# Patient Record
Sex: FEMALE | Race: WHITE | NOT HISPANIC OR LATINO | Employment: STUDENT | URBAN - METROPOLITAN AREA
[De-identification: names, ages, dates, MRNs, and addresses within clinical notes are randomized per-mention and may not be internally consistent; named-entity substitution may affect disease eponyms.]

---
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
## 2020-01-09 ENCOUNTER — TELEPHONE (OUTPATIENT)
Dept: FAMILY MEDICINE CLINIC | Facility: CLINIC | Age: 20
End: 2020-01-09

## 2020-01-09 NOTE — TELEPHONE ENCOUNTER
Patients mom Jeny called wanting to get her daughter Porsha a referral to see a dermatologist. She has real bad swollen eye lids.    Patient's mom would like a call back regarding this referral [phone]      Please advise

## 2020-01-09 NOTE — TELEPHONE ENCOUNTER
Called and spoke to patients mother and informed the referral was placed on 12/23/19 she was transferred to  to  See why no one had called to set up the appt

## 2020-02-25 ENCOUNTER — OFFICE VISIT (OUTPATIENT)
Dept: FAMILY MEDICINE CLINIC | Facility: CLINIC | Age: 20
End: 2020-02-25

## 2020-02-25 VITALS
RESPIRATION RATE: 14 BRPM | OXYGEN SATURATION: 98 % | SYSTOLIC BLOOD PRESSURE: 102 MMHG | HEIGHT: 69 IN | WEIGHT: 181 LBS | HEART RATE: 113 BPM | BODY MASS INDEX: 26.81 KG/M2 | TEMPERATURE: 99.4 F | DIASTOLIC BLOOD PRESSURE: 68 MMHG

## 2020-02-25 DIAGNOSIS — R68.89 FLU-LIKE SYMPTOMS: Primary | ICD-10-CM

## 2020-02-25 DIAGNOSIS — J11.1 FLU: ICD-10-CM

## 2020-02-25 LAB
EXPIRATION DATE: ABNORMAL
FLUAV AG NPH QL: NEGATIVE
FLUBV AG NPH QL: POSITIVE
INTERNAL CONTROL: ABNORMAL
Lab: ABNORMAL

## 2020-02-25 PROCEDURE — 87804 INFLUENZA ASSAY W/OPTIC: CPT | Performed by: NURSE PRACTITIONER

## 2020-02-25 PROCEDURE — 99213 OFFICE O/P EST LOW 20 MIN: CPT | Performed by: NURSE PRACTITIONER

## 2020-02-25 RX ORDER — OSELTAMIVIR PHOSPHATE 75 MG/1
75 CAPSULE ORAL 2 TIMES DAILY
Qty: 10 CAPSULE | Refills: 0 | Status: SHIPPED | OUTPATIENT
Start: 2020-02-25 | End: 2020-03-01

## 2020-02-25 NOTE — PATIENT INSTRUCTIONS
"Return if symptoms worsen or fail to improve.      Influenza, Adult  Influenza, more commonly known as \"the flu,\" is a viral infection that mainly affects the respiratory tract. The respiratory tract includes organs that help you breathe, such as the lungs, nose, and throat. The flu causes many symptoms similar to the common cold along with high fever and body aches.  The flu spreads easily from person to person (is contagious). Getting a flu shot (influenza vaccination) every year is the best way to prevent the flu.  What are the causes?  This condition is caused by the influenza virus. You can get the virus by:  · Breathing in droplets that are in the air from an infected person's cough or sneeze.  · Touching something that has been exposed to the virus (has been contaminated) and then touching your mouth, nose, or eyes.  What increases the risk?  The following factors may make you more likely to get the flu:  · Not washing or sanitizing your hands often.  · Having close contact with many people during cold and flu season.  · Touching your mouth, eyes, or nose without first washing or sanitizing your hands.  · Not getting a yearly (annual) flu shot.  You may have a higher risk for the flu, including serious problems such as a lung infection (pneumonia), if you:  · Are older than 65.  · Are pregnant.  · Have a weakened disease-fighting system (immune system). You may have a weakened immune system if you:  ? Have HIV or AIDS.  ? Are undergoing chemotherapy.  ? Are taking medicines that reduce (suppress) the activity of your immune system.  · Have a long-term (chronic) illness, such as heart disease, kidney disease, diabetes, or lung disease.  · Have a liver disorder.  · Are severely overweight (morbidly obese).  · Have anemia. This is a condition that affects your red blood cells.  · Have asthma.  What are the signs or symptoms?  Symptoms of this condition usually begin suddenly and last 4-14 days. They may " include:  · Fever and chills.  · Headaches, body aches, or muscle aches.  · Sore throat.  · Cough.  · Runny or stuffy (congested) nose.  · Chest discomfort.  · Poor appetite.  · Weakness or fatigue.  · Dizziness.  · Nausea or vomiting.  How is this diagnosed?  This condition may be diagnosed based on:  · Your symptoms and medical history.  · A physical exam.  · Swabbing your nose or throat and testing the fluid for the influenza virus.  How is this treated?  If the flu is diagnosed early, you can be treated with medicine that can help reduce how severe the illness is and how long it lasts (antiviral medicine). This may be given by mouth (orally) or through an IV.  Taking care of yourself at home can help relieve symptoms. Your health care provider may recommend:  · Taking over-the-counter medicines.  · Drinking plenty of fluids.  In many cases, the flu goes away on its own. If you have severe symptoms or complications, you may be treated in a hospital.  Follow these instructions at home:  Activity  · Rest as needed and get plenty of sleep.  · Stay home from work or school as told by your health care provider. Unless you are visiting your health care provider, avoid leaving home until your fever has been gone for 24 hours without taking medicine.  Eating and drinking  · Take an oral rehydration solution (ORS). This is a drink that is sold at pharmacies and retail stores.  · Drink enough fluid to keep your urine pale yellow.  · Drink clear fluids in small amounts as you are able. Clear fluids include water, ice chips, diluted fruit juice, and low-calorie sports drinks.  · Eat bland, easy-to-digest foods in small amounts as you are able. These foods include bananas, applesauce, rice, lean meats, toast, and crackers.  · Avoid drinking fluids that contain a lot of sugar or caffeine, such as energy drinks, regular sports drinks, and soda.  · Avoid alcohol.  · Avoid spicy or fatty foods.  General instructions      "    · Take over-the-counter and prescription medicines only as told by your health care provider.  · Use a cool mist humidifier to add humidity to the air in your home. This can make it easier to breathe.  · Cover your mouth and nose when you cough or sneeze.  · Wash your hands with soap and water often, especially after you cough or sneeze. If soap and water are not available, use alcohol-based hand .  · Keep all follow-up visits as told by your health care provider. This is important.  How is this prevented?    · Get an annual flu shot. You may get the flu shot in late summer, fall, or winter. Ask your health care provider when you should get your flu shot.  · Avoid contact with people who are sick during cold and flu season. This is generally fall and winter.  Contact a health care provider if:  · You develop new symptoms.  · You have:  ? Chest pain.  ? Diarrhea.  ? A fever.  · Your cough gets worse.  · You produce more mucus.  · You feel nauseous or you vomit.  Get help right away if:  · You develop shortness of breath or difficulty breathing.  · Your skin or nails turn a bluish color.  · You have severe pain or stiffness in your neck.  · You develop a sudden headache or sudden pain in your face or ear.  · You cannot eat or drink without vomiting.  Summary  · Influenza, more commonly known as \"the flu,\" is a viral infection that primarily affects your respiratory tract.  · Symptoms of the flu usually begin suddenly and last 4-14 days.  · Getting an annual flu shot is the best way to prevent getting the flu.  · Stay home from work or school as told by your health care provider. Unless you are visiting your health care provider, avoid leaving home until your fever has been gone for 24 hours without taking medicine.  · Keep all follow-up visits as told by your health care provider. This is important.  This information is not intended to replace advice given to you by your health care provider. Make sure " you discuss any questions you have with your health care provider.  Document Released: 12/15/2001 Document Revised: 06/05/2019 Document Reviewed: 06/05/2019  Elsevier Interactive Patient Education © 2020 [employer]

## 2020-02-25 NOTE — PROGRESS NOTES
Subjective   Porsha Galan is a 20 y.o. female.     History of Present Illness   Patient here for cough,  congestion and fever. She states that the  highest her fever was last night and was 102. She has been taking acetaminophen for her fever.     The following portions of the patient's history were reviewed and updated as appropriate: allergies, current medications, past family history, past medical history, past social history, past surgical history and problem list.    Review of Systems   Constitutional: Positive for chills, fatigue and fever. Negative for appetite change.   HENT: Positive for congestion, postnasal drip and voice change. Negative for ear pain, rhinorrhea, sinus pressure, sneezing and sore throat.    Eyes: Negative for redness and itching.   Respiratory: Positive for cough and shortness of breath. Negative for chest tightness.    Cardiovascular: Negative for chest pain, palpitations and leg swelling.   Musculoskeletal: Positive for myalgias.   Allergic/Immunologic: Negative.    Neurological: Positive for headache. Negative for dizziness.       Objective   Physical Exam   Constitutional: She is oriented to person, place, and time. She appears well-developed and well-nourished.   HENT:   Head: Normocephalic and atraumatic.   Right Ear: External ear and ear canal normal. A middle ear effusion is present.   Left Ear: External ear and ear canal normal. A middle ear effusion is present.   Nose: Rhinorrhea and congestion present.   Mouth/Throat: Uvula is midline and mucous membranes are normal. Oropharyngeal exudate, posterior oropharyngeal edema and posterior oropharyngeal erythema present. Tonsils are 2+ on the right. Tonsils are 2+ on the left. No tonsillar exudate.   Eyes: Pupils are equal, round, and reactive to light. Conjunctivae and EOM are normal. Right eye exhibits no discharge. Left eye exhibits no discharge.   Neck: Normal range of motion. Neck supple. No thyromegaly present.   Cardiovascular:  "Normal rate, regular rhythm, normal heart sounds and intact distal pulses.   No murmur heard.  Pulmonary/Chest: Effort normal and breath sounds normal. No tachypnea. No respiratory distress. She has no decreased breath sounds. She has no wheezes. She has no rales.   Lymphadenopathy:     She has no cervical adenopathy.   Neurological: She is alert and oriented to person, place, and time.   Skin: Skin is warm and dry.   Psychiatric: She has a normal mood and affect. Her behavior is normal. Judgment and thought content normal.   Nursing note and vitals reviewed.      Vitals:    02/25/20 0929   BP: 102/68   Pulse: 113   Resp: 14   Temp: 99.4 °F (37.4 °C)   SpO2: 98%     Body mass index is 26.73 kg/m².    Procedures    Assessment/Plan   Problems Addressed this Visit     None      Visit Diagnoses     Flu-like symptoms    -  Primary    Relevant Orders    POCT Influenza A/B        Positive for flu B  Return if symptoms worsen or fail to improve.          Patient Instructions   Return if symptoms worsen or fail to improve.      Influenza, Adult  Influenza, more commonly known as \"the flu,\" is a viral infection that mainly affects the respiratory tract. The respiratory tract includes organs that help you breathe, such as the lungs, nose, and throat. The flu causes many symptoms similar to the common cold along with high fever and body aches.  The flu spreads easily from person to person (is contagious). Getting a flu shot (influenza vaccination) every year is the best way to prevent the flu.  What are the causes?  This condition is caused by the influenza virus. You can get the virus by:  · Breathing in droplets that are in the air from an infected person's cough or sneeze.  · Touching something that has been exposed to the virus (has been contaminated) and then touching your mouth, nose, or eyes.  What increases the risk?  The following factors may make you more likely to get the flu:  · Not washing or sanitizing your hands " often.  · Having close contact with many people during cold and flu season.  · Touching your mouth, eyes, or nose without first washing or sanitizing your hands.  · Not getting a yearly (annual) flu shot.  You may have a higher risk for the flu, including serious problems such as a lung infection (pneumonia), if you:  · Are older than 65.  · Are pregnant.  · Have a weakened disease-fighting system (immune system). You may have a weakened immune system if you:  ? Have HIV or AIDS.  ? Are undergoing chemotherapy.  ? Are taking medicines that reduce (suppress) the activity of your immune system.  · Have a long-term (chronic) illness, such as heart disease, kidney disease, diabetes, or lung disease.  · Have a liver disorder.  · Are severely overweight (morbidly obese).  · Have anemia. This is a condition that affects your red blood cells.  · Have asthma.  What are the signs or symptoms?  Symptoms of this condition usually begin suddenly and last 4-14 days. They may include:  · Fever and chills.  · Headaches, body aches, or muscle aches.  · Sore throat.  · Cough.  · Runny or stuffy (congested) nose.  · Chest discomfort.  · Poor appetite.  · Weakness or fatigue.  · Dizziness.  · Nausea or vomiting.  How is this diagnosed?  This condition may be diagnosed based on:  · Your symptoms and medical history.  · A physical exam.  · Swabbing your nose or throat and testing the fluid for the influenza virus.  How is this treated?  If the flu is diagnosed early, you can be treated with medicine that can help reduce how severe the illness is and how long it lasts (antiviral medicine). This may be given by mouth (orally) or through an IV.  Taking care of yourself at home can help relieve symptoms. Your health care provider may recommend:  · Taking over-the-counter medicines.  · Drinking plenty of fluids.  In many cases, the flu goes away on its own. If you have severe symptoms or complications, you may be treated in a  hospital.  Follow these instructions at home:  Activity  · Rest as needed and get plenty of sleep.  · Stay home from work or school as told by your health care provider. Unless you are visiting your health care provider, avoid leaving home until your fever has been gone for 24 hours without taking medicine.  Eating and drinking  · Take an oral rehydration solution (ORS). This is a drink that is sold at pharmacies and retail stores.  · Drink enough fluid to keep your urine pale yellow.  · Drink clear fluids in small amounts as you are able. Clear fluids include water, ice chips, diluted fruit juice, and low-calorie sports drinks.  · Eat bland, easy-to-digest foods in small amounts as you are able. These foods include bananas, applesauce, rice, lean meats, toast, and crackers.  · Avoid drinking fluids that contain a lot of sugar or caffeine, such as energy drinks, regular sports drinks, and soda.  · Avoid alcohol.  · Avoid spicy or fatty foods.  General instructions         · Take over-the-counter and prescription medicines only as told by your health care provider.  · Use a cool mist humidifier to add humidity to the air in your home. This can make it easier to breathe.  · Cover your mouth and nose when you cough or sneeze.  · Wash your hands with soap and water often, especially after you cough or sneeze. If soap and water are not available, use alcohol-based hand .  · Keep all follow-up visits as told by your health care provider. This is important.  How is this prevented?    · Get an annual flu shot. You may get the flu shot in late summer, fall, or winter. Ask your health care provider when you should get your flu shot.  · Avoid contact with people who are sick during cold and flu season. This is generally fall and winter.  Contact a health care provider if:  · You develop new symptoms.  · You have:  ? Chest pain.  ? Diarrhea.  ? A fever.  · Your cough gets worse.  · You produce more mucus.  · You feel  "nauseous or you vomit.  Get help right away if:  · You develop shortness of breath or difficulty breathing.  · Your skin or nails turn a bluish color.  · You have severe pain or stiffness in your neck.  · You develop a sudden headache or sudden pain in your face or ear.  · You cannot eat or drink without vomiting.  Summary  · Influenza, more commonly known as \"the flu,\" is a viral infection that primarily affects your respiratory tract.  · Symptoms of the flu usually begin suddenly and last 4-14 days.  · Getting an annual flu shot is the best way to prevent getting the flu.  · Stay home from work or school as told by your health care provider. Unless you are visiting your health care provider, avoid leaving home until your fever has been gone for 24 hours without taking medicine.  · Keep all follow-up visits as told by your health care provider. This is important.  This information is not intended to replace advice given to you by your health care provider. Make sure you discuss any questions you have with your health care provider.  Document Released: 12/15/2001 Document Revised: 06/05/2019 Document Reviewed: 06/05/2019  [employer] Interactive Patient Education © 2020 [employer]        "

## 2020-03-30 ENCOUNTER — TELEPHONE (OUTPATIENT)
Dept: FAMILY MEDICINE CLINIC | Facility: CLINIC | Age: 20
End: 2020-03-30

## 2020-03-30 NOTE — TELEPHONE ENCOUNTER
PT CALLED STATING HAS BEEN WITH RUNNY NOSE AND EAR ACHE FOR A FEW DAYS. BELIEVES IS DUE TO ALLERGIES.    CAN PRESCRIBE A MEDICINE TO HELP?    CB#: [phone]

## 2020-03-30 NOTE — TELEPHONE ENCOUNTER
Patient informed to take Zyrtec and flonase if no better by Thursday call back patient stated understanding

## 2020-07-27 ENCOUNTER — TELEPHONE (OUTPATIENT)
Dept: FAMILY MEDICINE CLINIC | Facility: CLINIC | Age: 20
End: 2020-07-27

## 2020-07-27 NOTE — TELEPHONE ENCOUNTER
PATIENT CALLED TO CHECK WITH [facility] IF THE REFERRAL FOR OB/GYN COULD BE DONE SOONER, SINCE SHE IS LOOKING TO GET INTO BIRTH CONTROL ASAP. IF NOT WOULD LIKE AN APPT WITH [facility] AS SOON AS THIS WEEK.  PLEASE ADVISE

## 2020-09-30 ENCOUNTER — OFFICE VISIT (OUTPATIENT)
Dept: OBSTETRICS AND GYNECOLOGY | Age: 20
End: 2020-09-30

## 2020-09-30 VITALS
WEIGHT: 173.4 LBS | SYSTOLIC BLOOD PRESSURE: 104 MMHG | HEIGHT: 69 IN | BODY MASS INDEX: 25.68 KG/M2 | DIASTOLIC BLOOD PRESSURE: 58 MMHG

## 2020-09-30 DIAGNOSIS — Z30.09 ENCOUNTER FOR COUNSELING REGARDING CONTRACEPTION: ICD-10-CM

## 2020-09-30 DIAGNOSIS — N94.6 DYSMENORRHEA: ICD-10-CM

## 2020-09-30 DIAGNOSIS — Z01.419 WELL WOMAN EXAM WITH ROUTINE GYNECOLOGICAL EXAM: Primary | ICD-10-CM

## 2020-09-30 DIAGNOSIS — Z11.3 SCREEN FOR STD (SEXUALLY TRANSMITTED DISEASE): ICD-10-CM

## 2020-09-30 DIAGNOSIS — N92.0 MENORRHAGIA WITH REGULAR CYCLE: ICD-10-CM

## 2020-09-30 PROCEDURE — 99385 PREV VISIT NEW AGE 18-39: CPT | Performed by: OBSTETRICS & GYNECOLOGY

## 2020-09-30 NOTE — PROGRESS NOTES
"Chief complaint: annual/ New gyn    Subjective   History of Present Illness    Porsha Galan is a 20 y.o.  who presents for annual exam/ New gyn. Sent from primary care for contraception counseling. She has been sexually active in the past but not currently. She does desire contraception. She used ocps in the past but dc'd due to moodiness. She does have some acne. She c/o menorrhagia and dysmenorrhea.  Her menses are regular every 28-30 days, lasting 4 days, +menorrhagia and dysmenorrhea (lasts 3 days)  Soc hx- graduated from [employer]  in [place]. 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         Current contraception: abstinence  History of abnormal Pap smear: no  Received Gardasil immunization: yes -    Perform regular self breast exam: yes -    Family history of uterine or ovarian cancer: no  Family History of colon cancer: no  Family history of breast cancer: yes - paternal aunt    Mammogram: not indicated.  Colonoscopy: not indicated.  DEXA: not indicated.    Exercise: moderately active  Calcium/Vitamin D: adequate intake    The following portions of the patient's history were reviewed and updated as appropriate: allergies, current medications, past family history, past medical history, past social history, past surgical history and problem list.    Review of Systems    Pertinent items are noted in HPI.     Objective   Physical Exam    /58   Ht 175.3 cm (69\")   Wt 78.7 kg (173 lb 6.4 oz)   LMP 2020 (Approximate)   Breastfeeding No   BMI 25.61 kg/m²     General:   alert, appears stated age and cooperative   Neck: no asymmetry, masses, or scars "   Heart: regular rate and rhythm, S1, S2 normal, no murmur, click, rub or gallop   Lungs: clear to auscultation bilaterally   Abdomen: soft, non-tender, without masses or organomegaly   Breast: inspection negative, no nipple discharge or bleeding, no masses or nodularity palpable   Vulva: normal, Bartholin's, Urethra, [place]'s normal   Vagina: normal mucosa   Cervix: no cervical motion tenderness, no lesions and nulliparous appearance   Uterus: normal size, anteverted, mobile, non-tender, normal shape and consistency   Adnexa: normal adnexa and no mass, fullness, tenderness   Rectal: not indicated     Assessment/Plan   Porsha was seen today for gynecologic exam and establish care.    Diagnoses and all orders for this visit:    Well woman exam with routine gynecological exam    Screen for STD (sexually transmitted disease)  -     Chlamydia trachomatis, Neisseria gonorrhoeae, Trichomonas vaginalis, PCR - Swab, Vagina    Encounter for counseling regarding contraception    Menorrhagia with regular cycle  -     Norethin-Eth Estrad-Fe Biphas 1 MG-10 MCG / 10 MCG tablet; Take 1 tablet by mouth Daily.    Dysmenorrhea  -     Norethin-Eth Estrad-Fe Biphas 1 MG-10 MCG / 10 MCG tablet; Take 1 tablet by mouth Daily.    discussed options for contraception including a low dose ocp, nexplanon or IUD (progesterone) risks/benefits of different options reviewed  She would like to try a low dose/ weight neutral pill. This should improve her menorrhagia and dysmenorrhea and may help with acn  Risks of ocps- DVT,PE, stroke    If she dislikes this pill, she will consider Nexplanon or Kyleena    Breast self exam technique reviewed and patient encouraged to perform self-exam monthly.  Discussed healthy lifestyle modifications.  Pap smear not needed

## 2020-10-02 LAB
C TRACH RRNA SPEC QL NAA+PROBE: NEGATIVE
N GONORRHOEA RRNA SPEC QL NAA+PROBE: NEGATIVE
T VAGINALIS DNA SPEC QL NAA+PROBE: NEGATIVE

## 2020-10-08 ENCOUNTER — TELEPHONE (OUTPATIENT)
Dept: OBSTETRICS AND GYNECOLOGY | Age: 20
End: 2020-10-08

## 2020-11-03 ENCOUNTER — OFFICE VISIT (OUTPATIENT)
Dept: FAMILY MEDICINE CLINIC | Facility: CLINIC | Age: 20
End: 2020-11-03

## 2020-11-03 VITALS
RESPIRATION RATE: 14 BRPM | WEIGHT: 180 LBS | OXYGEN SATURATION: 98 % | DIASTOLIC BLOOD PRESSURE: 72 MMHG | HEIGHT: 69 IN | SYSTOLIC BLOOD PRESSURE: 118 MMHG | BODY MASS INDEX: 26.66 KG/M2

## 2020-11-03 DIAGNOSIS — Z23 NEED FOR IMMUNIZATION AGAINST INFLUENZA: ICD-10-CM

## 2020-11-03 DIAGNOSIS — R41.840 POOR CONCENTRATION: Primary | ICD-10-CM

## 2020-11-03 PROCEDURE — 99213 OFFICE O/P EST LOW 20 MIN: CPT | Performed by: NURSE PRACTITIONER

## 2020-11-03 PROCEDURE — 90471 IMMUNIZATION ADMIN: CPT | Performed by: NURSE PRACTITIONER

## 2020-11-03 PROCEDURE — 90686 IIV4 VACC NO PRSV 0.5 ML IM: CPT | Performed by: NURSE PRACTITIONER

## 2020-11-03 RX ORDER — NORETHINDRONE AND ETHINYL ESTRADIOL 0.8-25(24)
1 KIT ORAL DAILY
COMMUNITY
Start: 2020-10-20 | End: 2021-07-27 | Stop reason: SDUPTHER

## 2020-11-03 NOTE — PROGRESS NOTES
Subjective   Porsha Galan is a 20 y.o. female.     History of Present Illness   Patient presents with c/o of trouble focusing, ongoing. She reports that she's always had an issue with focusing, but since she's had to go to online classes it's become worse. She has never been treated or evaluated for ADD or ADHD.    The following portions of the patient's history were reviewed and updated as appropriate: allergies, current medications, past family history, past medical history, past social history, past surgical history and problem list.    Review of Systems   Constitutional: Negative for chills, fatigue and fever.   Respiratory: Negative for cough, chest tightness, shortness of breath and wheezing.    Cardiovascular: Negative for chest pain, palpitations and leg swelling.   Neurological: Negative for dizziness and headache.   Hematological: Negative.    Psychiatric/Behavioral: Positive for decreased concentration. Negative for sleep disturbance, depressed mood and stress. The patient is not nervous/anxious.        Objective   Physical Exam  Vitals signs and nursing note reviewed.   Constitutional:       Appearance: She is well-developed.   HENT:      Head: Normocephalic and atraumatic.   Eyes:      Conjunctiva/sclera: Conjunctivae normal.      Pupils: Pupils are equal, round, and reactive to light.   Cardiovascular:      Rate and Rhythm: Normal rate and regular rhythm.      Heart sounds: Normal heart sounds. No murmur.   Pulmonary:      Effort: Pulmonary effort is normal.      Breath sounds: Normal breath sounds.   Neurological:      Mental Status: She is alert and oriented to person, place, and time.   Psychiatric:         Behavior: Behavior normal.         Thought Content: Thought content normal.         Judgment: Judgment normal.         Vitals:    11/03/20 1017   BP: 118/72   Resp: 14   SpO2: 98%     Body mass index is 26.58 kg/m².    Procedures    Assessment/Plan   Problems Addressed this Visit     None       Visit Diagnoses     Poor concentration    -  Primary    Relevant Orders    Ambulatory Referral to Neuropsychology    Need for immunization against influenza        Relevant Orders    FluLaval Quad >6 Months ([id number])      Diagnoses       Codes Comments    Poor concentration    -  Primary ICD-10-CM: R41.840  ICD-9-CM: 799.51     Need for immunization against influenza     ICD-10-CM: Z23  ICD-9-CM: V04.81         Follow-up as needed.         Patient Instructions   Follow-up as needed.  Call with any questions or concerns.

## 2021-07-27 ENCOUNTER — TELEPHONE (OUTPATIENT)
Dept: OBSTETRICS AND GYNECOLOGY | Age: 21
End: 2021-07-27

## 2021-07-27 RX ORDER — NORETHINDRONE AND ETHINYL ESTRADIOL 0.8-25(24)
1 KIT ORAL DAILY
Qty: 28 TABLET | Refills: 3 | Status: SHIPPED | OUTPATIENT
Start: 2021-07-27 | End: 2021-10-06

## 2021-07-27 NOTE — TELEPHONE ENCOUNTER
Pt is requesting refills till her appt in October.    kaitlib fe 0.8-0.025mg chew    To [facility] [place]

## 2021-10-06 ENCOUNTER — OFFICE VISIT (OUTPATIENT)
Dept: OBSTETRICS AND GYNECOLOGY | Age: 21
End: 2021-10-06

## 2021-10-06 VITALS
HEIGHT: 69 IN | SYSTOLIC BLOOD PRESSURE: 112 MMHG | WEIGHT: 180.4 LBS | BODY MASS INDEX: 26.72 KG/M2 | DIASTOLIC BLOOD PRESSURE: 62 MMHG

## 2021-10-06 DIAGNOSIS — Z12.4 SCREENING FOR CERVICAL CANCER: ICD-10-CM

## 2021-10-06 DIAGNOSIS — Z01.419 WELL WOMAN EXAM WITH ROUTINE GYNECOLOGICAL EXAM: Primary | ICD-10-CM

## 2021-10-06 DIAGNOSIS — Z11.3 SCREEN FOR STD (SEXUALLY TRANSMITTED DISEASE): ICD-10-CM

## 2021-10-06 DIAGNOSIS — Z30.09 ENCOUNTER FOR OTHER GENERAL COUNSELING OR ADVICE ON CONTRACEPTION: ICD-10-CM

## 2021-10-06 PROCEDURE — 99395 PREV VISIT EST AGE 18-39: CPT | Performed by: STUDENT IN AN ORGANIZED HEALTH CARE EDUCATION/TRAINING PROGRAM

## 2021-10-06 RX ORDER — PROGESTERONE 200 MG/1
200 CAPSULE ORAL DAILY
Qty: 2 CAPSULE | Refills: 0 | Status: SHIPPED | OUTPATIENT
Start: 2021-10-06 | End: 2021-12-28

## 2021-10-06 NOTE — PATIENT INSTRUCTIONS
Wait 1 week after starting new contraception before relying on it for birth control.  Take progesterone the night before IUD insertion.  Take ibuprofen 800 mg 2 to 4 hours before IUD insertion.

## 2021-10-06 NOTE — PROGRESS NOTES
[facility]   Obstetrics and Gynecology   Routine Annual Visit    10/6/2021    Patient: Porsha Galan          MR#:[id number]    History of Present Illness    Chief Complaint   Patient presents with   • Gynecologic Exam     AE today, Last AE 2020, No pap yet, No problems today       21 y.o. female  who presents for annual exam.  She has been on OCPs for a long time but stopped them in July.  She feels like she is emotionally disregulated on OCPs.  She is interested in discussing other contraceptive options.  She is sexually active with her boyfriend only.    Status post Covid vaccine.  Has not yet gotten flu shot.    Obstetric History:  OB History        0    Para   0    Term   0       0    AB   0    Living   0       SAB   0    TAB   0    Ectopic   0    Molar   0    Multiple   0    Live Births   0               Menstrual History:     Patient's last menstrual period was 2021 (approximate).       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, also has 2 internships and plays on a league volleyball team  ________________________________________  Patient Active Problem List   Diagnosis   • Sports physical     Past Medical History:   Diagnosis Date   • Allergic rhinitis      Past Surgical History:   Procedure Laterality Date   • TYMPANOSTOMY TUBE PLACEMENT     • WISDOM TOOTH EXTRACTION Bilateral      Social History     Tobacco Use   Smoking Status Never Smoker   Smokeless Tobacco Never Used     Family History   Problem Relation Age of Onset   • No Known Problems Mother    • No Known Problems Father    • No Known Problems Brother    • Breast cancer Paternal Aunt    • Hypertension Maternal Grandmother    • Diabetes Maternal Grandfather    • Hypertension Maternal Grandfather    • Ovarian cancer Neg Hx    • Uterine cancer Neg Hx    • Colon cancer Neg Hx    • Deep vein thrombosis Neg  "Hx    • Pulmonary embolism Neg Hx      Prior to Admission medications    Medication Sig Start Date End Date Taking? Authorizing Provider   Oswald Fe 0.8-25 MG-MCG chewable tablet Chew 1 tablet Daily. 7/27/21   Christina John PA     ________________________________________    Current contraception: none  History of abnormal Pap smear: no  Family history of uterine or ovarian cancer: no  Family History of colon cancer/colon polyps: no  History of abnormal mammogram: no  History of abnormal lipids: no    The following portions of the patient's history were reviewed and updated as appropriate: allergies, current medications, past family history, past medical history, past social history, past surgical history and problem list.    Review of Systems   All other systems reviewed and are negative.           Objective     /62   Ht 175.3 cm (69\")   Wt 81.8 kg (180 lb 6.4 oz)   LMP 09/17/2021 (Approximate)   Breastfeeding No   BMI 26.64 kg/m²    BP Readings from Last 3 Encounters:   10/06/21 112/62   11/03/20 118/72   09/30/20 104/58      Wt Readings from Last 3 Encounters:   10/06/21 81.8 kg (180 lb 6.4 oz)   11/03/20 81.6 kg (180 lb)   09/30/20 78.7 kg (173 lb 6.4 oz)        BMI: Estimated body mass index is 26.64 kg/m² as calculated from the following:    Height as of this encounter: 175.3 cm (69\").    Weight as of this encounter: 81.8 kg (180 lb 6.4 oz).    Physical Exam  Vitals and nursing note reviewed. Exam conducted with a chaperone present.   Constitutional:       General: She is not in acute distress.     Appearance: Normal appearance.   HENT:      Head: Normocephalic and atraumatic.   Eyes:      Extraocular Movements: Extraocular movements intact.   Cardiovascular:      Rate and Rhythm: Normal rate and regular rhythm.      Pulses: Normal pulses.      Heart sounds: No murmur heard.     Pulmonary:      Effort: Pulmonary effort is normal. No respiratory distress.      Breath sounds: Normal breath " sounds.   Chest:      Breasts:         Right: Normal. No mass, nipple discharge, skin change or tenderness.         Left: Normal. No mass, nipple discharge, skin change or tenderness.   Abdominal:      General: There is no distension.      Palpations: Abdomen is soft. There is no mass.      Tenderness: There is no abdominal tenderness.   Genitourinary:     General: Normal vulva.      Labia:         Right: No rash or lesion.         Left: No rash or lesion.       Urethra: No prolapse, urethral swelling or urethral lesion.      Vagina: Normal.      Cervix: Normal.      Uterus: Normal.       Adnexa: Right adnexa normal and left adnexa normal.      Comments: Bladder: no masses or tenderness  Perineum/Anus: no masses, lesions, or skin changes  Musculoskeletal:         General: No swelling. Normal range of motion.      Cervical back: Normal range of motion.   Lymphadenopathy:      Upper Body:      Right upper body: No axillary adenopathy.      Left upper body: No axillary adenopathy.   Skin:     General: Skin is warm and dry.   Neurological:      General: No focal deficit present.      Mental Status: She is alert and oriented to person, place, and time.   Psychiatric:         Mood and Affect: Mood normal.         Behavior: Behavior normal.         As part of wellness and prevention, the following topics were discussed with the patient:  Encouraged self breast exam  Physical activity and regular exercised encouraged.   Injury prevention discussed.  Healthy weight discussed.  Nutrition discussed.  Substance abuse/misuse discussed.  Sexual behavior/safe practices discussed.   Sexual transmitted disease prevention   Contraception discussed.   Mental health discussed.   Vaccinations/immunizations addressed.             Assessment:  Diagnoses and all orders for this visit:    1. Well woman exam with routine gynecological exam (Primary)  -     IGP,CtNgTv,rfx Apt HPV All  - HIV  - RPR  -Breast and pelvic exam normal  -STD screen  today  -Pap smear today  -Status post Covid vaccine, encouraged flu shot when it is available to her    2. Screening for cervical cancer  -     IGP,CtNgTv,rfx Apt HPV All    3. Encounter for other general counseling or advice on contraception  -We discussed all contraceptive options  -Patient would like Mirena IUD.  She would like Ortho Evra in the meantime while we are checking benefits.  We discussed waiting 1 week before relying on this for birth control.  -Check IUD benefits today and schedule for insertion  -Progesterone sent to pharmacy for night before insertion.  Instructed patient to take ibuprofen 800 mg 2 to 4 hours before insertion.    4. Screen for STD (sexually transmitted disease)  -     IGP,CtNgTv,rfx Apt HPV All    Other orders  -     norelgestromin-ethinyl estradiol (ORTHO EVRA) 150-35 MCG/24HR; Place 1 patch on the skin as directed by provider 1 (One) Time Per Week.  Dispense: 3 patch; Refill: 12  -     Progesterone (Prometrium) 200 MG capsule; Take 1 capsule by mouth Daily.  Dispense: 2 capsule; Refill: 0          Plan:  RTC for IUD insertion      Miley Jimenes MD  10/6/2021 14:39 EDT

## 2021-10-08 LAB
C TRACH RRNA CVX QL NAA+PROBE: NEGATIVE
CONV .: NORMAL
CYTOLOGIST CVX/VAG CYTO: NORMAL
CYTOLOGY CVX/VAG DOC CYTO: NORMAL
CYTOLOGY CVX/VAG DOC THIN PREP: NORMAL
DX ICD CODE: NORMAL
HIV 1 & 2 AB SER-IMP: NORMAL
N GONORRHOEA RRNA CVX QL NAA+PROBE: NEGATIVE
OTHER STN SPEC: NORMAL
STAT OF ADQ CVX/VAG CYTO-IMP: NORMAL
T VAGINALIS RRNA SPEC QL NAA+PROBE: NEGATIVE

## 2021-10-21 ENCOUNTER — TELEPHONE (OUTPATIENT)
Dept: OBSTETRICS AND GYNECOLOGY | Age: 21
End: 2021-10-21

## 2021-10-21 NOTE — TELEPHONE ENCOUNTER
LMTC regarding lab orders.  Specimens were not obtained when she was  in the office.  Does she still plan on doing these tests?

## 2021-12-16 ENCOUNTER — TELEPHONE (OUTPATIENT)
Dept: OBSTETRICS AND GYNECOLOGY | Age: 21
End: 2021-12-16

## 2021-12-16 NOTE — TELEPHONE ENCOUNTER
Patient would like a new rx for ocp's.She stated the birth control patch is not working out for her. Her lmp was 12/15/21.She uses the [facility] on file.

## 2021-12-22 NOTE — TELEPHONE ENCOUNTER
Spoke w/pt.  She has decided against the IUD for now.  She would like to talk to you about different BCP options.  She said she did not really like the two that she has been on in the past.

## 2021-12-23 NOTE — TELEPHONE ENCOUNTER
Could you please call and schedule a gyn follow up appt w/Dr. Jimenes for this pt to discuss birth control options?

## 2021-12-28 ENCOUNTER — OFFICE VISIT (OUTPATIENT)
Dept: OBSTETRICS AND GYNECOLOGY | Age: 21
End: 2021-12-28

## 2021-12-28 VITALS
SYSTOLIC BLOOD PRESSURE: 114 MMHG | BODY MASS INDEX: 27.4 KG/M2 | HEIGHT: 69 IN | WEIGHT: 185 LBS | DIASTOLIC BLOOD PRESSURE: 74 MMHG

## 2021-12-28 DIAGNOSIS — Z30.09 ENCOUNTER FOR OTHER GENERAL COUNSELING OR ADVICE ON CONTRACEPTION: Primary | ICD-10-CM

## 2021-12-28 PROCEDURE — 99213 OFFICE O/P EST LOW 20 MIN: CPT | Performed by: STUDENT IN AN ORGANIZED HEALTH CARE EDUCATION/TRAINING PROGRAM

## 2021-12-28 NOTE — PROGRESS NOTES
[facility]   Obstetrics and Gynecology     2021      Patient:  Porsha Galan   MR#:[id number]    Office note    Chief Complaint   Patient presents with   • Follow-up     Discuss birth control options       Subjective     History of Present Illness  21 y.o. female  presents for f/u of contraception counseling.  Patient had previously opted for IUD but then got nervous and canceled appointment.  She tried the patch but could not keep it on her left shoulder for more than a day or 2.  She now wants to discuss contraceptive options again.      Patient Active Problem List   Diagnosis   • Sports physical       Past Medical History:   Diagnosis Date   • Allergic rhinitis      Past Surgical History:   Procedure Laterality Date   • TYMPANOSTOMY TUBE PLACEMENT     • WISDOM TOOTH EXTRACTION Bilateral      Obstetric History:  OB History        0    Para   0    Term   0       0    AB   0    Living   0       SAB   0    IAB   0    Ectopic   0    Molar   0    Multiple   0    Live Births   0               Menstrual History:     Patient's last menstrual period was 2021 (approximate).       The patient has never been pregnant.  Family History   Problem Relation Age of Onset   • No Known Problems Mother    • No Known Problems Father    • No Known Problems Brother    • Breast cancer Paternal Aunt    • Hypertension Maternal Grandmother    • Diabetes Maternal Grandfather    • Hypertension Maternal Grandfather    • Ovarian cancer Neg Hx    • Uterine cancer Neg Hx    • Colon cancer Neg Hx    • Deep vein thrombosis Neg Hx    • Pulmonary embolism Neg Hx      Social History     Tobacco Use   • Smoking status: Never Smoker   • Smokeless tobacco: Never Used   Vaping Use   • Vaping Use: Never used   Substance Use Topics   • Alcohol use: Yes     Comment: OCCASIONAL   • Drug use: No     Patient has no known allergies.    Current Outpatient Medications:   •  norelgestromin-ethinyl estradiol (ORTHO EVRA)  "150-35 MCG/24HR, Place 1 patch on the skin as directed by provider 1 (One) Time Per Week., Disp: 3 patch, Rfl: 12    The following portions of the patient's history were reviewed and updated as appropriate: allergies, current medications, past family history, past medical history, past social history, past surgical history and problem list.    Review of Systems   All other systems reviewed and are negative.      BP Readings from Last 3 Encounters:   12/28/21 114/74   10/06/21 112/62   11/03/20 118/72      Wt Readings from Last 3 Encounters:   12/28/21 83.9 kg (185 lb)   10/06/21 81.8 kg (180 lb 6.4 oz)   11/03/20 81.6 kg (180 lb)      BMI: Estimated body mass index is 27.32 kg/m² as calculated from the following:    Height as of this encounter: 175.3 cm (69\").    Weight as of this encounter: 83.9 kg (185 lb). BSA: Estimated body surface area is 2 meters squared as calculated from the following:    Height as of this encounter: 175.3 cm (69\").    Weight as of this encounter: 83.9 kg (185 lb).    Objective   Physical Exam  Vitals and nursing note reviewed.   Constitutional:       General: She is not in acute distress.     Appearance: Normal appearance.   Pulmonary:      Effort: Pulmonary effort is normal.   Neurological:      Mental Status: She is alert.         Assessment/Plan     Diagnoses and all orders for this visit:    1. Encounter for other general counseling or advice on contraception (Primary)    -Discussed all contraceptive options, patient opted for IUD  -Plan to check benefits for Mirena IUD and schedule insertion before patient goes back to school on the 18th  -Discussed taking Prometrium and ibuprofen prior to appointment    Return for schedule IUD insertion ASAP (goes back to school 1/17).    Miley Jimenes MD   12/28/2021 11:54 EST  "

## 2021-12-28 NOTE — PATIENT INSTRUCTIONS
Morning of IUD insertion, take prometrium.  30min before appointment, take ibuprofen 800mg.   Pt mother notified and she states she no longer needs it.

## 2021-12-30 ENCOUNTER — OFFICE VISIT (OUTPATIENT)
Dept: OBSTETRICS AND GYNECOLOGY | Age: 21
End: 2021-12-30

## 2021-12-30 VITALS
SYSTOLIC BLOOD PRESSURE: 118 MMHG | BODY MASS INDEX: 27.28 KG/M2 | HEIGHT: 69 IN | DIASTOLIC BLOOD PRESSURE: 70 MMHG | WEIGHT: 184.2 LBS

## 2021-12-30 DIAGNOSIS — Z01.812 PRE-PROCEDURE LAB EXAM: ICD-10-CM

## 2021-12-30 DIAGNOSIS — Z30.430 ENCOUNTER FOR IUD INSERTION: Primary | ICD-10-CM

## 2021-12-30 LAB
B-HCG UR QL: NEGATIVE
EXPIRATION DATE: NORMAL
INTERNAL NEGATIVE CONTROL: NEGATIVE
INTERNAL POSITIVE CONTROL: POSITIVE
Lab: NORMAL

## 2021-12-30 PROCEDURE — 81025 URINE PREGNANCY TEST: CPT | Performed by: STUDENT IN AN ORGANIZED HEALTH CARE EDUCATION/TRAINING PROGRAM

## 2021-12-30 PROCEDURE — 58300 INSERT INTRAUTERINE DEVICE: CPT | Performed by: STUDENT IN AN ORGANIZED HEALTH CARE EDUCATION/TRAINING PROGRAM

## 2021-12-30 RX ORDER — IBUPROFEN 800 MG/1
800 TABLET ORAL EVERY 6 HOURS PRN
COMMUNITY

## 2021-12-30 NOTE — PROGRESS NOTES
IUD Insertion Procedure Note    Indication: Desires long acting reversible contraception     Procedure Details   Urine pregnancy test confirmed negative.  The risks (including infection, bleeding, pain, and uterine perforation) and benefits of the procedure were explained to the patient and verbal informed consent was obtained.      Cervix was cleansed with Betadine. Allis clamp applied to anterior cervix.  Uterus sounded to 8 cm. IUD inserted without difficulty. Strings trimmed to 2-3 cm.    IUD Information:  Mirena, Lot # [id number], Expiration date 3/2024.    Condition:  Stable    Complications:  None, patient tolerated the procedure well    Plan:  The patient was advised to call for fever, prolonged or severe pain, or persistent bleeding. She was advised to use NSAIDs as needed for mild to moderate pain.  Follow up PRN.    Miley Jimenes MD  12/30/21  16:13 EST

## 2022-07-25 ENCOUNTER — TRANSCRIBE ORDERS (OUTPATIENT)
Dept: LAB | Facility: HOSPITAL | Age: 22
End: 2022-07-25

## 2022-07-25 ENCOUNTER — LAB (OUTPATIENT)
Dept: LAB | Facility: HOSPITAL | Age: 22
End: 2022-07-25

## 2022-07-25 DIAGNOSIS — L57.8 NODULAR ELASTOSIS WITH CYSTS AND COMEDONES OF FAVRE AND RACOUCHOT: ICD-10-CM

## 2022-07-25 DIAGNOSIS — L70.0 NODULAR ELASTOSIS WITH CYSTS AND COMEDONES OF FAVRE AND RACOUCHOT: Primary | ICD-10-CM

## 2022-07-25 DIAGNOSIS — L57.8 NODULAR ELASTOSIS WITH CYSTS AND COMEDONES OF FAVRE AND RACOUCHOT: Primary | ICD-10-CM

## 2022-07-25 DIAGNOSIS — L70.0 NODULAR ELASTOSIS WITH CYSTS AND COMEDONES OF FAVRE AND RACOUCHOT: ICD-10-CM

## 2022-07-25 LAB
CHOLEST SERPL-MCNC: 139 MG/DL (ref 0–200)
HCG SERPL QL: NEGATIVE
HDLC SERPL-MCNC: 49 MG/DL (ref 40–60)
LDLC SERPL CALC-MCNC: 76 MG/DL (ref 0–100)
LDLC/HDLC SERPL: 1.56 {RATIO}
TRIGL SERPL-MCNC: 69 MG/DL (ref 0–150)
VLDLC SERPL-MCNC: 14 MG/DL (ref 5–40)

## 2022-07-25 PROCEDURE — 80061 LIPID PANEL: CPT

## 2022-07-25 PROCEDURE — 36415 COLL VENOUS BLD VENIPUNCTURE: CPT

## 2022-07-25 PROCEDURE — 84703 CHORIONIC GONADOTROPIN ASSAY: CPT

## 2022-08-23 ENCOUNTER — LAB (OUTPATIENT)
Dept: LAB | Facility: HOSPITAL | Age: 22
End: 2022-08-23

## 2022-08-23 ENCOUNTER — TRANSCRIBE ORDERS (OUTPATIENT)
Dept: ADMINISTRATIVE | Facility: HOSPITAL | Age: 22
End: 2022-08-23

## 2022-08-23 DIAGNOSIS — L70.0 NODULAR ELASTOSIS WITH CYSTS AND COMEDONES OF FAVRE AND RACOUCHOT: Primary | ICD-10-CM

## 2022-08-23 DIAGNOSIS — L57.8 NODULAR ELASTOSIS WITH CYSTS AND COMEDONES OF FAVRE AND RACOUCHOT: Primary | ICD-10-CM

## 2022-08-23 LAB — HCG INTACT+B SERPL-ACNC: <1 MIU/ML

## 2022-08-23 PROCEDURE — 36415 COLL VENOUS BLD VENIPUNCTURE: CPT | Performed by: DERMATOLOGY

## 2022-08-23 PROCEDURE — 84702 CHORIONIC GONADOTROPIN TEST: CPT | Performed by: DERMATOLOGY

## 2022-09-23 ENCOUNTER — LAB (OUTPATIENT)
Dept: LAB | Facility: HOSPITAL | Age: 22
End: 2022-09-23

## 2022-09-23 ENCOUNTER — TRANSCRIBE ORDERS (OUTPATIENT)
Dept: ADMINISTRATIVE | Facility: HOSPITAL | Age: 22
End: 2022-09-23

## 2022-09-23 DIAGNOSIS — L70.0 ACNE VULGARIS: ICD-10-CM

## 2022-09-23 DIAGNOSIS — L70.0 ACNE VULGARIS: Primary | ICD-10-CM

## 2022-09-23 LAB
ALBUMIN SERPL-MCNC: 4.7 G/DL (ref 3.5–5.2)
ALP SERPL-CCNC: 49 U/L (ref 39–117)
ALT SERPL W P-5'-P-CCNC: 13 U/L (ref 1–33)
AST SERPL-CCNC: 16 U/L (ref 1–32)
BILIRUB CONJ SERPL-MCNC: <0.2 MG/DL (ref 0–0.3)
BILIRUB INDIRECT SERPL-MCNC: NORMAL MG/DL
BILIRUB SERPL-MCNC: 0.5 MG/DL (ref 0–1.2)
CHOLEST SERPL-MCNC: 121 MG/DL (ref 0–200)
HCG SERPL QL: NEGATIVE
HDLC SERPL-MCNC: 53 MG/DL (ref 40–60)
LDLC SERPL CALC-MCNC: 59 MG/DL (ref 0–100)
LDLC/HDLC SERPL: 1.16 {RATIO}
PROT SERPL-MCNC: 6.8 G/DL (ref 6–8.5)
TRIGL SERPL-MCNC: 33 MG/DL (ref 0–150)
VLDLC SERPL-MCNC: 9 MG/DL (ref 5–40)

## 2022-09-23 PROCEDURE — 36415 COLL VENOUS BLD VENIPUNCTURE: CPT

## 2022-09-23 PROCEDURE — 80061 LIPID PANEL: CPT

## 2022-09-23 PROCEDURE — 80076 HEPATIC FUNCTION PANEL: CPT

## 2022-09-23 PROCEDURE — 84703 CHORIONIC GONADOTROPIN ASSAY: CPT

## 2022-10-26 ENCOUNTER — LAB (OUTPATIENT)
Dept: LAB | Facility: HOSPITAL | Age: 22
End: 2022-10-26

## 2022-10-26 ENCOUNTER — TRANSCRIBE ORDERS (OUTPATIENT)
Dept: ADMINISTRATIVE | Facility: HOSPITAL | Age: 22
End: 2022-10-26

## 2022-10-26 DIAGNOSIS — L70.0 ACNE VULGARIS: ICD-10-CM

## 2022-10-26 DIAGNOSIS — L70.0 ACNE VULGARIS: Primary | ICD-10-CM

## 2022-10-26 LAB — HCG SERPL QL: NEGATIVE

## 2022-10-26 PROCEDURE — 84703 CHORIONIC GONADOTROPIN ASSAY: CPT

## 2022-10-26 PROCEDURE — 36415 COLL VENOUS BLD VENIPUNCTURE: CPT

## 2022-10-28 ENCOUNTER — LAB (OUTPATIENT)
Dept: LAB | Facility: HOSPITAL | Age: 22
End: 2022-10-28

## 2022-10-28 ENCOUNTER — TRANSCRIBE ORDERS (OUTPATIENT)
Dept: ADMINISTRATIVE | Facility: HOSPITAL | Age: 22
End: 2022-10-28

## 2022-10-28 DIAGNOSIS — L57.8 NODULAR ELASTOSIS WITH CYSTS AND COMEDONES OF FAVRE AND RACOUCHOT: ICD-10-CM

## 2022-10-28 DIAGNOSIS — L70.0 NODULAR ELASTOSIS WITH CYSTS AND COMEDONES OF FAVRE AND RACOUCHOT: Primary | ICD-10-CM

## 2022-10-28 DIAGNOSIS — L70.0 NODULAR ELASTOSIS WITH CYSTS AND COMEDONES OF FAVRE AND RACOUCHOT: ICD-10-CM

## 2022-10-28 DIAGNOSIS — L57.8 NODULAR ELASTOSIS WITH CYSTS AND COMEDONES OF FAVRE AND RACOUCHOT: Primary | ICD-10-CM

## 2022-10-28 LAB
ALBUMIN SERPL-MCNC: 4.6 G/DL (ref 3.5–5.2)
ALP SERPL-CCNC: 55 U/L (ref 39–117)
ALT SERPL W P-5'-P-CCNC: 14 U/L (ref 1–33)
AST SERPL-CCNC: 21 U/L (ref 1–32)
BILIRUB CONJ SERPL-MCNC: <0.2 MG/DL (ref 0–0.3)
BILIRUB INDIRECT SERPL-MCNC: NORMAL MG/DL
BILIRUB SERPL-MCNC: 0.3 MG/DL (ref 0–1.2)
CHOLEST SERPL-MCNC: 150 MG/DL (ref 0–200)
HDLC SERPL-MCNC: 50 MG/DL (ref 40–60)
LDLC SERPL CALC-MCNC: 84 MG/DL (ref 0–100)
LDLC/HDLC SERPL: 1.67 {RATIO}
PROT SERPL-MCNC: 7 G/DL (ref 6–8.5)
TRIGL SERPL-MCNC: 82 MG/DL (ref 0–150)
VLDLC SERPL-MCNC: 16 MG/DL (ref 5–40)

## 2022-10-28 PROCEDURE — 80076 HEPATIC FUNCTION PANEL: CPT

## 2022-10-28 PROCEDURE — 80061 LIPID PANEL: CPT

## 2022-10-28 PROCEDURE — 36415 COLL VENOUS BLD VENIPUNCTURE: CPT

## 2022-11-25 ENCOUNTER — TRANSCRIBE ORDERS (OUTPATIENT)
Dept: ADMINISTRATIVE | Facility: HOSPITAL | Age: 22
End: 2022-11-25

## 2022-11-25 ENCOUNTER — LAB (OUTPATIENT)
Dept: LAB | Facility: HOSPITAL | Age: 22
End: 2022-11-25

## 2022-11-25 DIAGNOSIS — L70.0 ACNE VULGARIS: ICD-10-CM

## 2022-11-25 DIAGNOSIS — L70.0 ACNE VULGARIS: Primary | ICD-10-CM

## 2022-11-25 LAB
CHOLEST SERPL-MCNC: 139 MG/DL (ref 0–200)
HCG INTACT+B SERPL-ACNC: <1 MIU/ML
HDLC SERPL-MCNC: 52 MG/DL (ref 40–60)
LDLC SERPL CALC-MCNC: 73 MG/DL (ref 0–100)
LDLC/HDLC SERPL: 1.4 {RATIO}
TRIGL SERPL-MCNC: 71 MG/DL (ref 0–150)
VLDLC SERPL-MCNC: 14 MG/DL (ref 5–40)

## 2022-11-25 PROCEDURE — 80061 LIPID PANEL: CPT

## 2022-11-25 PROCEDURE — 84702 CHORIONIC GONADOTROPIN TEST: CPT | Performed by: DERMATOLOGY

## 2022-11-25 PROCEDURE — 36415 COLL VENOUS BLD VENIPUNCTURE: CPT | Performed by: DERMATOLOGY

## 2022-12-28 ENCOUNTER — TRANSCRIBE ORDERS (OUTPATIENT)
Dept: LAB | Facility: HOSPITAL | Age: 22
End: 2022-12-28

## 2022-12-28 ENCOUNTER — LAB (OUTPATIENT)
Dept: LAB | Facility: HOSPITAL | Age: 22
End: 2022-12-28
Payer: COMMERCIAL

## 2022-12-28 DIAGNOSIS — L70.0 NODULAR ELASTOSIS WITH CYSTS AND COMEDONES OF FAVRE AND RACOUCHOT: Primary | ICD-10-CM

## 2022-12-28 DIAGNOSIS — L57.8 NODULAR ELASTOSIS WITH CYSTS AND COMEDONES OF FAVRE AND RACOUCHOT: ICD-10-CM

## 2022-12-28 DIAGNOSIS — L70.0 NODULAR ELASTOSIS WITH CYSTS AND COMEDONES OF FAVRE AND RACOUCHOT: ICD-10-CM

## 2022-12-28 DIAGNOSIS — L57.8 NODULAR ELASTOSIS WITH CYSTS AND COMEDONES OF FAVRE AND RACOUCHOT: Primary | ICD-10-CM

## 2022-12-28 LAB
CHOLEST SERPL-MCNC: 154 MG/DL (ref 0–200)
HDLC SERPL-MCNC: 45 MG/DL (ref 40–60)
LDLC SERPL CALC-MCNC: 96 MG/DL (ref 0–100)
LDLC/HDLC SERPL: 2.14 {RATIO}
TRIGL SERPL-MCNC: 63 MG/DL (ref 0–150)
VLDLC SERPL-MCNC: 13 MG/DL (ref 5–40)

## 2022-12-28 PROCEDURE — 80061 LIPID PANEL: CPT

## 2022-12-28 PROCEDURE — 36415 COLL VENOUS BLD VENIPUNCTURE: CPT

## 2022-12-30 ENCOUNTER — LAB (OUTPATIENT)
Dept: LAB | Facility: HOSPITAL | Age: 22
End: 2022-12-30
Payer: COMMERCIAL

## 2022-12-30 DIAGNOSIS — L57.8 NODULAR ELASTOSIS WITH CYSTS AND COMEDONES OF FAVRE AND RACOUCHOT: ICD-10-CM

## 2022-12-30 DIAGNOSIS — L70.0 NODULAR ELASTOSIS WITH CYSTS AND COMEDONES OF FAVRE AND RACOUCHOT: ICD-10-CM

## 2022-12-30 LAB
ALBUMIN SERPL-MCNC: 4.4 G/DL (ref 3.5–5.2)
ALP SERPL-CCNC: 48 U/L (ref 39–117)
ALT SERPL W P-5'-P-CCNC: 15 U/L (ref 1–33)
AST SERPL-CCNC: 19 U/L (ref 1–32)
BILIRUB CONJ SERPL-MCNC: <0.2 MG/DL (ref 0–0.3)
BILIRUB INDIRECT SERPL-MCNC: NORMAL MG/DL
BILIRUB SERPL-MCNC: 0.2 MG/DL (ref 0–1.2)
HCG SERPL QL: NEGATIVE
PROT SERPL-MCNC: 6.7 G/DL (ref 6–8.5)

## 2022-12-30 PROCEDURE — 84703 CHORIONIC GONADOTROPIN ASSAY: CPT

## 2022-12-30 PROCEDURE — 80076 HEPATIC FUNCTION PANEL: CPT

## 2022-12-30 PROCEDURE — 36415 COLL VENOUS BLD VENIPUNCTURE: CPT

## 2023-01-24 ENCOUNTER — TRANSCRIBE ORDERS (OUTPATIENT)
Dept: ADMINISTRATIVE | Facility: HOSPITAL | Age: 23
End: 2023-01-24
Payer: COMMERCIAL

## 2023-01-24 ENCOUNTER — LAB (OUTPATIENT)
Dept: LAB | Facility: HOSPITAL | Age: 23
End: 2023-01-24
Payer: COMMERCIAL

## 2023-01-24 DIAGNOSIS — L70.0 ACNE VULGARIS: Primary | ICD-10-CM

## 2023-01-24 LAB
ALBUMIN SERPL-MCNC: 4.7 G/DL (ref 3.5–5.2)
ALP SERPL-CCNC: 49 U/L (ref 39–117)
ALT SERPL W P-5'-P-CCNC: 21 U/L (ref 1–33)
AST SERPL-CCNC: 20 U/L (ref 1–32)
BILIRUB CONJ SERPL-MCNC: <0.2 MG/DL (ref 0–0.3)
BILIRUB INDIRECT SERPL-MCNC: NORMAL MG/DL
BILIRUB SERPL-MCNC: 0.2 MG/DL (ref 0–1.2)
CHOLEST SERPL-MCNC: 140 MG/DL (ref 0–200)
HCG SERPL QL: NEGATIVE
HDLC SERPL-MCNC: 48 MG/DL (ref 40–60)
LDLC SERPL CALC-MCNC: 73 MG/DL (ref 0–100)
LDLC/HDLC SERPL: 1.5 {RATIO}
PROT SERPL-MCNC: 6.7 G/DL (ref 6–8.5)
TRIGL SERPL-MCNC: 100 MG/DL (ref 0–150)
VLDLC SERPL-MCNC: 19 MG/DL (ref 5–40)

## 2023-01-24 PROCEDURE — 84703 CHORIONIC GONADOTROPIN ASSAY: CPT

## 2023-01-24 PROCEDURE — 36415 COLL VENOUS BLD VENIPUNCTURE: CPT

## 2023-01-24 PROCEDURE — 80076 HEPATIC FUNCTION PANEL: CPT

## 2023-01-24 PROCEDURE — 80061 LIPID PANEL: CPT

## 2023-02-01 ENCOUNTER — LAB (OUTPATIENT)
Dept: LAB | Facility: HOSPITAL | Age: 23
End: 2023-02-01
Payer: COMMERCIAL

## 2023-02-01 ENCOUNTER — TRANSCRIBE ORDERS (OUTPATIENT)
Dept: ADMINISTRATIVE | Facility: HOSPITAL | Age: 23
End: 2023-02-01
Payer: COMMERCIAL

## 2023-02-01 DIAGNOSIS — L70.0 COMMON ACNE: Primary | ICD-10-CM

## 2023-02-01 LAB — HCG INTACT+B SERPL-ACNC: <1 MIU/ML

## 2023-02-01 PROCEDURE — 84702 CHORIONIC GONADOTROPIN TEST: CPT | Performed by: DERMATOLOGY

## 2023-02-01 PROCEDURE — 36415 COLL VENOUS BLD VENIPUNCTURE: CPT | Performed by: DERMATOLOGY

## 2024-02-29 ENCOUNTER — OFFICE VISIT (OUTPATIENT)
Dept: FAMILY MEDICINE CLINIC | Facility: CLINIC | Age: 24
End: 2024-02-29
Payer: COMMERCIAL

## 2024-02-29 VITALS
DIASTOLIC BLOOD PRESSURE: 72 MMHG | BODY MASS INDEX: 30.37 KG/M2 | WEIGHT: 189 LBS | HEIGHT: 66 IN | SYSTOLIC BLOOD PRESSURE: 102 MMHG | HEART RATE: 69 BPM | RESPIRATION RATE: 18 BRPM | OXYGEN SATURATION: 99 %

## 2024-02-29 DIAGNOSIS — E66.9 CLASS 1 OBESITY WITHOUT SERIOUS COMORBIDITY WITH BODY MASS INDEX (BMI) OF 30.0 TO 30.9 IN ADULT, UNSPECIFIED OBESITY TYPE: ICD-10-CM

## 2024-02-29 DIAGNOSIS — L73.2 HIDRADENITIS SUPPURATIVA OF RIGHT AXILLA: Primary | ICD-10-CM

## 2024-02-29 PROBLEM — E66.811 CLASS 1 OBESITY WITHOUT SERIOUS COMORBIDITY WITH BODY MASS INDEX (BMI) OF 30.0 TO 30.9 IN ADULT: Status: ACTIVE | Noted: 2024-02-29

## 2024-02-29 NOTE — ASSESSMENT & PLAN NOTE
Patient's (Body mass index is 30.51 kg/m².) indicates that they are obese (BMI >30) with health conditions that include none . Weight is newly identified. BMI  is above average; BMI management plan is completed. We discussed portion control, increasing exercise, joining a fitness center or start home based exercise program, Weight Watchers or other Commercial based weight reduction program, and an britt-based approach such as [employer] Pal or Lose It.

## 2024-02-29 NOTE — PROGRESS NOTES
Subjective   Porsha Galan is a 24 y.o. female.     History of Present Illness   New patient , formerly establish in this practice. Has not been seen in > 3 years    Acute on chronic Mass in R axilla x 6 months+ . Saw urgent care 11/8/23 for swelling, took keflex 500 mg tid x 10 days. This went down, but comes back and fills up. This sometimes has fluid that comes out of it, serosanguinous drainage and is tender. She is worried this is lymph node. No fevers, no nightsweats, no weight loss. No breast changes. No other lesions under breasts or in groin or other axilla. She does report this is her heaviest weight. She is active playing volleyball and eating well. BMI 30    The following portions of the patient's history were reviewed and updated as appropriate: allergies, current medications, past family history, past medical history, past social history, past surgical history and problem list.    Review of Systems   Constitutional:  Negative for activity change, chills, fatigue and fever.   Eyes:  Negative for visual disturbance.   Respiratory:  Negative for cough, shortness of breath and wheezing.    Cardiovascular:  Negative for chest pain, palpitations and leg swelling.   Genitourinary:  Negative for breast lump and breast pain.   Musculoskeletal:  Negative for back pain.   Skin:  Positive for skin lesions.   Neurological:  Negative for headache.   Psychiatric/Behavioral:  Negative for depressed mood. The patient is not nervous/anxious.        Objective   Physical Exam  Vitals reviewed.   Constitutional:       Appearance: Normal appearance.   HENT:      Mouth/Throat:      Mouth: Mucous membranes are moist.   Cardiovascular:      Rate and Rhythm: Normal rate and regular rhythm.      Pulses: Normal pulses.      Heart sounds: Normal heart sounds.   Pulmonary:      Effort: Pulmonary effort is normal.      Breath sounds: Normal breath sounds.   Abdominal:      General: Bowel sounds are normal.      Palpations: Abdomen is  soft.   Musculoskeletal:         General: Normal range of motion.   Skin:     General: Skin is warm.      Findings: Lesion present.   Neurological:      General: No focal deficit present.      Mental Status: She is alert.         Vitals:    02/29/24 0825   BP: 102/72   Pulse: 69   Resp: 18   SpO2: 99%     Body mass index is 30.51 kg/m².    Procedures    Assessment & Plan   Problems Addressed this Visit       Class 1 obesity without serious comorbidity with body mass index (BMI) of 30.0 to 30.9 in adult     Patient's (Body mass index is 30.51 kg/m².) indicates that they are obese (BMI >30) with health conditions that include none . Weight is newly identified. BMI  is above average; BMI management plan is completed. We discussed portion control, increasing exercise, joining a fitness center or start home based exercise program, Weight Watchers or other Commercial based weight reduction program, and an britt-based approach such as QPD Pal or Lose It.           Other Visit Diagnoses       Hidradenitis suppurativa of right axilla    -  Primary    Relevant Medications    mupirocin (BACTROBAN) 2 % ointment          Diagnoses         Codes Comments    Hidradenitis suppurativa of right axilla    -  Primary ICD-10-CM: L73.2  ICD-9-CM: 705.83     Class 1 obesity without serious comorbidity with body mass index (BMI) of 30.0 to 30.9 in adult, unspecified obesity type     ICD-10-CM: E66.9, Z68.30  ICD-9-CM: 278.00, V85.30           Hidradenitis Sippurativa- reviewed HS education, treatment and printed out Uptodate info for pt, she has derm, would follow w them w flares for steroid injection, enc pt to keep skin clean and dry, can use dial soap in axilla, rx mupirocin for swelling, pain, dng when this happens. She declines referral at this time to general surgery.     BMI 30- work on diet, exercise, drink mostly water, more protein, fiber, walking.     Return for annual exam and labs this year     Education provided in AVS    No follow-ups on file.

## 2024-12-10 ENCOUNTER — OFFICE VISIT (OUTPATIENT)
Dept: FAMILY MEDICINE CLINIC | Facility: CLINIC | Age: 24
End: 2024-12-10
Payer: COMMERCIAL

## 2024-12-10 VITALS
DIASTOLIC BLOOD PRESSURE: 72 MMHG | TEMPERATURE: 98.2 F | HEART RATE: 82 BPM | SYSTOLIC BLOOD PRESSURE: 120 MMHG | HEIGHT: 66 IN | BODY MASS INDEX: 27.74 KG/M2 | WEIGHT: 172.6 LBS | OXYGEN SATURATION: 99 % | RESPIRATION RATE: 18 BRPM

## 2024-12-10 DIAGNOSIS — R05.1 ACUTE COUGH: ICD-10-CM

## 2024-12-10 DIAGNOSIS — J20.9 ACUTE BRONCHITIS, UNSPECIFIED ORGANISM: Primary | ICD-10-CM

## 2024-12-10 LAB
EXPIRATION DATE: NORMAL
FLUAV AG UPPER RESP QL IA.RAPID: NOT DETECTED
FLUBV AG UPPER RESP QL IA.RAPID: NOT DETECTED
INTERNAL CONTROL: NORMAL
Lab: NORMAL
SARS-COV-2 AG UPPER RESP QL IA.RAPID: NOT DETECTED

## 2024-12-10 PROCEDURE — 87428 SARSCOV & INF VIR A&B AG IA: CPT | Performed by: NURSE PRACTITIONER

## 2024-12-10 PROCEDURE — 99213 OFFICE O/P EST LOW 20 MIN: CPT | Performed by: NURSE PRACTITIONER

## 2024-12-10 RX ORDER — BENZONATATE 100 MG/1
100 CAPSULE ORAL 3 TIMES DAILY PRN
Qty: 30 CAPSULE | Refills: 0 | Status: SHIPPED | OUTPATIENT
Start: 2024-12-10 | End: 2024-12-20

## 2024-12-10 RX ORDER — AZITHROMYCIN 250 MG/1
TABLET, FILM COATED ORAL
Qty: 6 TABLET | Refills: 0 | Status: SHIPPED | OUTPATIENT
Start: 2024-12-10

## 2024-12-10 RX ORDER — METHYLPREDNISOLONE 4 MG/1
TABLET ORAL
Qty: 21 TABLET | Refills: 0 | Status: SHIPPED | OUTPATIENT
Start: 2024-12-10

## 2024-12-10 NOTE — PROGRESS NOTES
Subjective   Porsha Galan is a 24 y.o. female.     History of Present Illness     The following portions of the patient's history were reviewed and updated as appropriate: allergies, current medications, past family history, past medical history, past social history, past surgical history and problem list.       The patient is a 24-year-old female who presents for evaluation of a persistent cough.    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 Her brother, who resides in [place], was also unwell during his visit home for [facility], but subsequent medical consultation did not reveal any positive test results. She is uncertain if her current symptoms are related to her brother's illness. She has been experiencing a persistent cough for the past 1.5 weeks, which is most severe upon lying down and waking up in the morning. Despite the cough, she reports feeling generally well. She does not experience any pain during deep inhalation and is able to sleep through the night. She has no history of asthma and rarely smokes, with no recent smoking within the past week. She has not required an inhaler or had bronchitis in the past. She has not been prescribed antibiotics recently. She reports occasional nasal discharge in the mornings but does not experience any facial pressure or headaches. She has not used Tessalon Perles for cough management. She has attempted to manage her symptoms with over-the-counter medications including DayQuil, NyQuil, Mucinex DM, Robitussin, and cough drops, which provided some relief during the initial 24-hour period of body aches but have not alleviated her cough. She has previously been on a steroid regimen without any adverse reactions.    Supplemental Information  She has an ointment for a rash from her hidradenitis suppurativa. She saw dermatology for that.    SOCIAL HISTORY  She very rarely smokes but has not smoked this week. She works as a   for the [employer].    MEDICATIONS  Current: DayQuil, NyQuil, Mucinex DM, Robitussin         Review of Systems        Current Outpatient Medications:     Levonorgestrel (MIRENA) 20 MCG/DAY intrauterine device IUD, To be inserted one time by prescriber. Route intrauterine., Disp: , Rfl:     mupirocin (BACTROBAN) 2 % ointment, Apply 1 Application topically to the appropriate area as directed 3 (Three) Times a Day., Disp: 15 g, Rfl: 0    azithromycin (ZITHROMAX) 250 MG tablet, Take 2 tablets the first day, then 1 tablet daily for 4 days., Disp: 6 tablet, Rfl: 0    benzonatate (Tessalon Perles) 100 MG capsule, Take 1 capsule by mouth 3 (Three) Times a Day As Needed for Cough for up to 10 days., Disp: 30 capsule, Rfl: 0    Chlorhexidine Gluconate (Hibiclens) 4 % solution, Use as a wash two times per day as directed. (Patient not taking: Reported on 12/10/2024), Disp: 236 mL, Rfl: 3    methylPREDNISolone (MEDROL) 4 MG dose pack, Take as directed on package instructions., Disp: 21 tablet, Rfl: 0    Objective   Physical Exam  Vitals reviewed.   Constitutional:       Appearance: She is ill-appearing.   HENT:      Right Ear: Tympanic membrane normal.      Left Ear: Tympanic membrane normal.      Nose: Nose normal. Congestion present.      Mouth/Throat:      Mouth: Mucous membranes are moist.   Cardiovascular:      Rate and Rhythm: Normal rate and regular rhythm.      Pulses: Normal pulses.   Pulmonary:      Effort: Pulmonary effort is normal.      Breath sounds: Normal breath sounds. Examination of the right-lower field reveals decreased breath sounds. Examination of the left-lower field reveals decreased breath sounds.   Abdominal:      General: Bowel sounds are normal.   Skin:     General: Skin is warm.      Findings: No rash.   Neurological:      General: No focal deficit present.      Mental Status: She is alert.         Vitals:    12/10/24 1512   BP: 120/72   Pulse: 82   Resp: 18   Temp: 98.2 °F (36.8  "°C)   SpO2: 99%     Body mass index is 27.86 kg/m².    Procedures    No results found for: \"CMP\", \"BMP\", \"TSH\", \"CBC\", \"HGBA1C\", \"LIPIDINTERP\"           Assessment & Plan   Problems Addressed this Visit    None  Visit Diagnoses       Acute bronchitis, unspecified organism    -  Primary    Relevant Medications    benzonatate (Tessalon Perles) 100 MG capsule          Diagnoses         Codes Comments    Acute bronchitis, unspecified organism    -  Primary ICD-10-CM: J20.9  ICD-9-CM: 466.0           Orders Placed This Encounter   Procedures    POCT SARS-CoV-2 + Flu Antigen LOVE     Order Specific Question:   Release to patient     Answer:   Routine Release [[id number]]     Negative flu and covid      1. Acute bronchitis vs CAP.  Given the current prevalence of bacterial pneumonia in the community, treatment will be initiated as if it is community-acquired pneumonia. The bases of her lungs do not sound good. A prescription for a Z-Rohan (azithromycin) has been provided, with instructions to take 2 pills today, followed by 1 pill every other day for a total of 5 days. A tapering steroid regimen has also been prescribed. She is advised to take Tessalon Perles up to 3 times daily for cough relief. Over-the-counter medications such as NyQuil at night and Mucinex during the day can be continued. Additional recommendations include hot steamy showers, nasal saline rinses, and humidifier use to help loosen congestion. She is encouraged to increase fluid intake, vitamin C, and zinc supplementation to aid recovery. She is instructed to seek immediate medical attention at the ER if she experiences worsening symptoms, sudden shortness of breath, or fever after 2 days of antibiotic therapy.              Education provided in AVS   Return if symptoms worsen or fail to improve.    Patient or patient representative verbalized consent for the use of Ambient Listening during the visit with  EVE Mcdowell for chart documentation. " 12/10/2024  15:29 EST

## 2024-12-12 ENCOUNTER — TELEPHONE (OUTPATIENT)
Dept: FAMILY MEDICINE CLINIC | Facility: CLINIC | Age: 24
End: 2024-12-12

## 2024-12-12 NOTE — TELEPHONE ENCOUNTER
PATIENT CALLED AND STATES SHE WAS SEEN ON 12/10/24. SHE HAS BEEN ON MEDICATION SINCE THAT DATE. SHE IS NEEDING A WORK NOTE TO ATTEND A WORK PARTY 12/13/24, TO MAKE THEM AWARE SHE IS NOT CONTAGIOUS.     CALL BACK NUMBER [phone]    PLEASE SEND NOTE TO MY CHART

## 2024-12-18 ENCOUNTER — OFFICE VISIT (OUTPATIENT)
Dept: FAMILY MEDICINE CLINIC | Facility: CLINIC | Age: 24
End: 2024-12-18
Payer: COMMERCIAL

## 2024-12-18 VITALS
HEIGHT: 66 IN | DIASTOLIC BLOOD PRESSURE: 84 MMHG | OXYGEN SATURATION: 97 % | RESPIRATION RATE: 18 BRPM | HEART RATE: 132 BPM | BODY MASS INDEX: 27.8 KG/M2 | SYSTOLIC BLOOD PRESSURE: 130 MMHG | TEMPERATURE: 100.2 F | WEIGHT: 173 LBS

## 2024-12-18 DIAGNOSIS — R05.9 COUGH, UNSPECIFIED TYPE: Primary | ICD-10-CM

## 2024-12-18 LAB
EXPIRATION DATE: ABNORMAL
FLUAV AG UPPER RESP QL IA.RAPID: DETECTED
FLUBV AG UPPER RESP QL IA.RAPID: NOT DETECTED
INTERNAL CONTROL: ABNORMAL
Lab: ABNORMAL
SARS-COV-2 AG UPPER RESP QL IA.RAPID: NOT DETECTED

## 2024-12-18 PROCEDURE — 87428 SARSCOV & INF VIR A&B AG IA: CPT | Performed by: NURSE PRACTITIONER

## 2024-12-18 PROCEDURE — 99213 OFFICE O/P EST LOW 20 MIN: CPT | Performed by: NURSE PRACTITIONER

## 2024-12-18 RX ORDER — OSELTAMIVIR PHOSPHATE 75 MG/1
75 CAPSULE ORAL 2 TIMES DAILY
Qty: 10 CAPSULE | Refills: 0 | Status: SHIPPED | OUTPATIENT
Start: 2024-12-18 | End: 2024-12-23

## 2024-12-18 NOTE — PROGRESS NOTES
Subjective   Porsha Galan is a 24 y.o. female.     Fever   Associated symptoms include coughing.   Cough  Associated symptoms include a fever.        The following portions of the patient's history were reviewed and updated as appropriate: allergies, current medications, past family history, past medical history, past social history, past surgical history and problem list.       The patient is a 24-year-old female who presents for evaluation of cough and congestion.    She was treated for pneumonia symptoms on 12/10/2024 with a Z-Rohan and steroid. Recent fever, cough, malaise, worsening. She has tested positive for influenza A today. She reports experiencing a fever the previous night, accompanied by a severe cough that has persisted for the past two days. Initially, she attributed the cough to fatigue in her lungs, but it has since escalated in severity. She also notes that her cough had shown signs of improvement for a brief period before worsening again. She believes she is maintaining adequate hydration.    SOCIAL HISTORY  She works as a manager.    MEDICATIONS  Current: Z-Rohan, steroid         Review of Systems   Constitutional:  Positive for fever.   Respiratory:  Positive for cough.            Current Outpatient Medications:     benzonatate (Tessalon Perles) 100 MG capsule, Take 1 capsule by mouth 3 (Three) Times a Day As Needed for Cough for up to 10 days., Disp: 30 capsule, Rfl: 0    Levonorgestrel (MIRENA) 20 MCG/DAY intrauterine device IUD, To be inserted one time by prescriber. Route intrauterine., Disp: , Rfl:     mupirocin (BACTROBAN) 2 % ointment, Apply 1 Application topically to the appropriate area as directed 3 (Three) Times a Day., Disp: 15 g, Rfl: 0    Chlorhexidine Gluconate (Hibiclens) 4 % solution, Use as a wash two times per day as directed. (Patient not taking: Reported on 12/18/2024), Disp: 236 mL, Rfl: 3    oseltamivir (Tamiflu) 75 MG capsule, Take 1 capsule by mouth 2 (Two) Times a Day for  "5 days., Disp: 10 capsule, Rfl: 0    Objective   Physical Exam  Vitals reviewed.   Constitutional:       Appearance: She is ill-appearing.   HENT:      Mouth/Throat:      Mouth: Mucous membranes are moist.   Cardiovascular:      Rate and Rhythm: Tachycardia present.   Pulmonary:      Effort: Pulmonary effort is normal.      Breath sounds: Normal breath sounds.   Abdominal:      General: Bowel sounds are normal.   Skin:     General: Skin is warm.   Neurological:      General: No focal deficit present.         Vitals:    12/18/24 0908   BP: 130/84   Pulse: (!) 132   Resp: 18   Temp: 100.2 °F (37.9 °C)   SpO2: 97%     Body mass index is 27.92 kg/m².    Procedures    No results found for: \"CMP\", \"BMP\", \"TSH\", \"CBC\", \"HGBA1C\", \"LIPIDINTERP\"                Assessment & Plan   Problems Addressed this Visit    None  Visit Diagnoses       Cough, unspecified type    -  Primary    Relevant Orders    POCT SARS-CoV-2 Antigen LOVE + Flu (Completed)          Diagnoses         Codes Comments    Cough, unspecified type    -  Primary ICD-10-CM: R05.9  ICD-9-CM: 786.2           + flu, negative covid      1. Influenza A.  She tested positive for influenza A today. A prescription for Tamiflu 75 mg, to be taken twice daily for 5 days, has been sent to [facility]. She is advised to take the medication with food to mitigate potential nausea. She is instructed to maintain social distance to prevent the spread of the virus. Over-the-counter medications such as Tylenol and ibuprofen are recommended for symptom management. She is encouraged to increase fluid intake, rest, and consume extra vitamin C. A work note has been provided, allowing her to return to work on Monday, 12/23/2024, contingent upon being fever-free. She should inform recent contacts about her diagnosis.              Education provided in AVS   Return if symptoms worsen or fail to improve.    Patient or patient representative verbalized consent for the use of " Ambient Listening during the visit with  EVE Mcdowell for chart documentation. 12/18/2024  09:40 EST

## 2024-12-18 NOTE — LETTER
December 18, 2024     Patient: Porsha Galan   YOB: 2000   Date of Visit: 12/18/2024       To Whom It May Concern:    It is my medical opinion that Porsha Galan may return to work Mon Dec 23 if fever free. Please excuse missed days this week due to illness  .         Sincerely,        EVE Mcdowell    CC: No Recipients